# Patient Record
(demographics unavailable — no encounter records)

---

## 2024-10-17 NOTE — PLAN
[TextEntry] : D&C, hysteroscopy Consent signed in office Medical clearance required by PCP Surgical coordinator to reach out regarding scheduling PST labs: CBC, SMA-7,PTT/INR, EKG, CA/Phos/Mg

## 2024-10-17 NOTE — PHYSICAL EXAM
[Chaperone Present] : A chaperone was present in the examining room during all aspects of the physical examination [10805] : A chaperone was present during the pelvic exam. [FreeTextEntry2] : Mimi Patel PA-C. [Normal] : Bimanual Exam: Normal [Fully active, able to carry on all pre-disease performance without restriction] : Status 0 - Fully active, able to carry on all pre-disease performance without restriction

## 2024-10-17 NOTE — END OF VISIT
[FreeTextEntry3] : I, Dr. Heather Clemente, personally performed the evaluation and management (E/M) services for this new patient. That E/M includes conducting the initial examination, assessing all conditions, and establishing the plan of care. Today, Mimi Patel PA-C, was here to observe my evaluation and management services for this patient to be followed going forward.

## 2024-10-17 NOTE — ASSESSMENT
[FreeTextEntry1] : 74yo female with a thickened 16mm endometrium with no PMB.  I discussed with the patient my recommendation for an exam under anesthesia with biopsies to obtain adequate tissue sample to ensure there is no pre cancer or cancer. Given her significant cervical stenosis on examination, I will not be able to perform this in office today. I discussed at length with the patient the nature, purpose, risks, benefits and alternatives. The patient understands the risks to include (but not be limited to) uterine perforation with possible need for laparoscopy and/or laparotomy, infection with need for hospitalization, fluid overload with possible critical illness as a consequence, and bleeding with need for transfusion. She agrees to proceed.  All questions and concerns were answered to patient's apparent satisfaction.

## 2024-10-17 NOTE — HISTORY OF PRESENT ILLNESS
[FreeTextEntry1] : 74yo  via  LMP in 40s referred by Dr. Cabello for evaluation of a thickened endometrium noted on CT.  Pt had CT scan performed due to history of diverticulosis and was incidentally found to have a thickened endometrium. Pelvic US revealed a 16mm EMS with a possible endometrial polyp. Pt was noted to have significant cervical stenosis precluding EMB even after Cytotec x 2. Pt was referred here for management and possible hysterectomy. Denies unintentional weight loss, abdominal or pelvis pain, bleeding, change in bladder or bowel habits, hematochezia.   Pap smear- - awaiting results Mammo- Overdue. Has RX. Colonoscopy- 2016- Diverticulosis. Internal hemorrhoids. Repeat 5 years. DEXA-3/123-osteoporosis

## 2024-10-17 NOTE — CHIEF COMPLAINT
[FreeTextEntry1] : [facility] [address] [place] [id number] [phone]  Thickened endometrium

## 2024-10-17 NOTE — HISTORY OF PRESENT ILLNESS
[FreeTextEntry1] : 76yo  via  LMP in 40s referred by Dr. Cabello for evaluation of a thickened endometrium noted on CT.  Pt had CT scan performed due to history of diverticulosis and was incidentally found to have a thickened endometrium. Pelvic US revealed a 16mm EMS with a possible endometrial polyp. Pt was noted to have significant cervical stenosis precluding EMB even after Cytotec x 2. Pt was referred here for management and possible hysterectomy. Denies unintentional weight loss, abdominal or pelvis pain, bleeding, change in bladder or bowel habits, hematochezia.   Pap smear- - awaiting results Mammo- Overdue. Has RX. Colonoscopy- 2016- Diverticulosis. Internal hemorrhoids. Repeat 5 years. DEXA-3/123-osteoporosis

## 2024-10-17 NOTE — PHYSICAL EXAM
[Chaperone Present] : A chaperone was present in the examining room during all aspects of the physical examination [46211] : A chaperone was present during the pelvic exam. [FreeTextEntry2] : Mimi Patel PA-C. [Normal] : Bimanual Exam: Normal [Fully active, able to carry on all pre-disease performance without restriction] : Status 0 - Fully active, able to carry on all pre-disease performance without restriction

## 2024-10-17 NOTE — ASSESSMENT
[FreeTextEntry1] : 76yo female with a thickened 16mm endometrium with no PMB.  I discussed with the patient my recommendation for an exam under anesthesia with biopsies to obtain adequate tissue sample to ensure there is no pre cancer or cancer. Given her significant cervical stenosis on examination, I will not be able to perform this in office today. I discussed at length with the patient the nature, purpose, risks, benefits and alternatives. The patient understands the risks to include (but not be limited to) uterine perforation with possible need for laparoscopy and/or laparotomy, infection with need for hospitalization, fluid overload with possible critical illness as a consequence, and bleeding with need for transfusion. She agrees to proceed.  All questions and concerns were answered to patient's apparent satisfaction.

## 2024-10-17 NOTE — CHIEF COMPLAINT
[FreeTextEntry1] : [facility] [address] [place] [id number] [phone]  Thickened endometrium

## 2024-10-18 NOTE — ASSESSMENT
[FreeTextEntry1] : 75F  w/ HTN, MNG, prediabetes, and diverticulitis s/p colonic resection here for follow up rtc in 6 months with sono and labs fna in nov for RMP 0.9cm  MNG- 3/2023 RLP 1.8cm complex nodule benign. reviewed images, appear to be similar and the biopsied nodule a bit smaller in size. final sono report pending images reviewed- 1cm RMP with calcifications, should get FNA Reviewed FNA biopsy procedure with the patient. Reviewed possible results with the patient. Results in about 1 week with genetic testing taking up another week. Biopsy to be scheduled in the office. Side effects of soreness/bleeding/infection reviewed with the patient. Patient agreeable to having the biopsy. Discussed that if it is thyroid cancer, would refer to a surgeon. If benign, would repeat ultrasound in 6 months.   Prediabetes- needs to work on diet and exercise  HTN- BP acceptable, on norvasc, hctz, acei, bb  Osteoporosis- likely has it. does not want to talk about meds. does not want dexa. will need meds if has a fracture.  check pth/vit d level next time/c telopeptide discussed prolia briefly in the past

## 2024-10-18 NOTE — HISTORY OF PRESENT ILLNESS
[FreeTextEntry1] : here MNG  thyroid hx: incidental thyroid nodule thyroid ultrasound: none FH of thyroid issue: none, FH of thyroid cancer: none, FH of diabetes: none head/neck external beam radiation: none meds for thyroid: none menopause:40 years but stopped prempro 42yo  interval history mother in law to [place] feels good has thickened endometrium, need d &c in nov/dec  labs reviewed 9/18/2024 a1c 6.1 3/2024 tsh normal dexa 3/2023 images are off, should be repeated, none repeated no falls or fractures  taking vitamin d 2000 IU in mvi drinking milk

## 2024-11-22 NOTE — PROCEDURE
[FreeTextEntry1] : The risks, benefits and procedure were explained to the patient and informed consent was obtained. A time out was called confirming patient's name, procedure and side. The thyroid nodule was visualized as described below. The neck was cleaned and prepped with alcohol. The skin was anesthetized with ethyl chloride anesthetic.  Using aseptic technique and under ultrasound guidance the RMP thyroid nodule was aspirated using 3 separate 25 gauge needles. Pressure was held to minimize bruising.  The patient tolerated the procedure well.  The specimen was prepped in the usual fashion and sent to [facility] cytology.   ---------------- Measurements: RMP 0.9cm

## 2025-02-20 NOTE — REASON FOR VISIT
[de-identified] : 2/11/2025 [de-identified] : 1. Dilation and curettage 2. Hysteroscopic polypectomy [de-identified] : Patient called the office 2/12/25, reports to vomiting x 2 overnight, and only able to hold liquids down at this time, although barely with PO intake. She also reports to decreased UOP, when asked when the last time was she can not report if she went for certain today and if so it was minimal. She admits to abdominal pain/cramping sensation still present. Patient weak/fatigue sounding on the phone, advised ED evaluation due to increase in symptoms out of proportion for surgical procedure she had. She agreed to comply, hospital team made aware. All questions answered. ED evaluation to r/o uterine perforation vs. intrabdominal bleeding vs. dehydration vs. GI etiology given history of diverticulosis/diverticulitis.

## 2025-02-20 NOTE — DISCUSSION/SUMMARY
[FreeTextEntry1] : Pertinent Findings: Normal atrophic vagina and no lesions noted. Small stenotic cervix difficult to visualize. Uterus sounded to 5.5 cm. Atrophic endometrium with large fundal polyp 3 cm, stenotic bilateral ostia visualized, stenotic endocervix. Normal endocervix.  PATHOLOGY PENDING

## 2025-07-02 NOTE — PLAN
ENT ISSUE/POD: R OE    HPI: Patient seen and evaluated at bedside this AM. Reports no pain in ear but continued sensation of ear swelling. Denies any fluid drainage, ear wick remains in place.        PAST MEDICAL & SURGICAL HISTORY:  No pertinent past medical history      No significant past surgical history        Allergies    No Known Allergies    Intolerances      MEDICATIONS  (STANDING):  chlorhexidine 2% Cloths 1 Application(s) Topical daily  ciprofloxacin  0.3% Ophthalmic Solution for OTIC Use 5 Drop(s) Right Ear two times a day  dexAMETHasone 0.1% Ophthalmic Solution for OTIC Use 5 Drop(s) Right Ear two times a day  piperacillin/tazobactam IVPB.. 3.375 Gram(s) IV Intermittent every 8 hours    MEDICATIONS  (PRN):  acetaminophen     Tablet .. 650 milliGRAM(s) Oral every 6 hours PRN Temp greater or equal to 38C (100.4F), Mild Pain (1 - 3)  aluminum hydroxide/magnesium hydroxide/simethicone Suspension 30 milliLiter(s) Oral every 4 hours PRN Dyspepsia  ketorolac   Injectable 15 milliGRAM(s) IV Push every 6 hours PRN moderate and severe pain  melatonin 3 milliGRAM(s) Oral at bedtime PRN Insomnia  ondansetron Injectable 4 milliGRAM(s) IV Push every 8 hours PRN Nausea and/or Vomiting      Social History: see consult note    Family history: see consult note    ROS:   ENT: all negative except as noted in HPI   Pulm: denies SOB, cough, hemoptysis  Neuro: denies numbness/tingling, loss of sensation  Endo: denies heat/cold intolerance, excessive sweating      Vital Signs Last 24 Hrs  T(C): 36.4 (02 Jul 2025 05:38), Max: 36.9 (01 Jul 2025 21:25)  T(F): 97.6 (02 Jul 2025 05:38), Max: 98.5 (01 Jul 2025 21:25)  HR: 62 (02 Jul 2025 05:38) (62 - 66)  BP: 100/63 (02 Jul 2025 05:38) (100/62 - 114/64)  BP(mean): --  RR: 17 (02 Jul 2025 05:38) (16 - 17)  SpO2: 99% (02 Jul 2025 05:38) (99% - 100%)    Parameters below as of 02 Jul 2025 05:38  Patient On (Oxygen Delivery Method): room air                              12.1   10.76 )-----------( 362      ( 02 Jul 2025 06:05 )             37.3    07-02    138  |  106  |  9   ----------------------------<  95  3.8   |  20[L]  |  0.68    Ca    9.4      02 Jul 2025 06:05  Phos  4.1     07-02  Mg     2.10     07-02         PHYSICAL EXAM:  Gen: NAD  Skin: No rashes, bruises, or lesions  Head: Normocephalic, Atraumatic  Face: no edema, erythema, or fluctuance. Mild swelling along the right TMJ but no TTP  Eyes: no scleral injection  Ears: Right - removed ear wick to examine ear, ear canal inflamed with debris within the canal (suctioned out and debrided), unable to visualize TM, replaced wick  Nose: Nares bilaterally patent, no discharge  Mouth: No stridor, no drooling, no trismus.  Mucosa moist, tongue/uvula midline, oropharynx clear  Neck: Flat, supple, no lymphadenopathy, trachea midline, no masses  Lymphatic: No lymphadenopathy  Neuro: facial nerve intact, no facial droop         [TextEntry] : D&C, hysteroscopy Consent signed in office Medical clearance required by PCP Surgical coordinator to reach out regarding scheduling PST labs: CBC, SMA-7,PTT/INR, EKG, CA/Phos/Mg